# Patient Record
Sex: FEMALE | Race: WHITE
[De-identification: names, ages, dates, MRNs, and addresses within clinical notes are randomized per-mention and may not be internally consistent; named-entity substitution may affect disease eponyms.]

---

## 2017-10-13 ENCOUNTER — HOSPITAL ENCOUNTER (OUTPATIENT)
Dept: HOSPITAL 61 - RAD | Age: 37
Discharge: HOME | End: 2017-10-13
Payer: OTHER GOVERNMENT

## 2017-10-13 VITALS — WEIGHT: 130 LBS | HEIGHT: 70 IN | BODY MASS INDEX: 18.61 KG/M2

## 2017-10-13 DIAGNOSIS — J45.909: ICD-10-CM

## 2017-10-13 DIAGNOSIS — N97.9: Primary | ICD-10-CM

## 2017-10-13 LAB
NEG OBC UR: (no result)
POS OBC UR: (no result)

## 2017-10-13 PROCEDURE — 58340 CATHETER FOR HYSTEROGRAPHY: CPT

## 2017-10-13 PROCEDURE — 81025 URINE PREGNANCY TEST: CPT

## 2017-10-13 PROCEDURE — 74740 X-RAY FEMALE GENITAL TRACT: CPT

## 2018-04-01 ENCOUNTER — HOSPITAL ENCOUNTER (EMERGENCY)
Dept: HOSPITAL 63 - ER | Age: 38
Discharge: HOME | End: 2018-04-01
Payer: OTHER GOVERNMENT

## 2018-04-01 VITALS — SYSTOLIC BLOOD PRESSURE: 107 MMHG | DIASTOLIC BLOOD PRESSURE: 63 MMHG

## 2018-04-01 VITALS — BODY MASS INDEX: 18.8 KG/M2 | HEIGHT: 71 IN | WEIGHT: 134.26 LBS

## 2018-04-01 DIAGNOSIS — E86.0: Primary | ICD-10-CM

## 2018-04-01 DIAGNOSIS — D64.9: ICD-10-CM

## 2018-04-01 LAB
APTT PPP: YELLOW S
BACTERIA #/AREA URNS HPF: 0 /HPF
BILIRUB UR QL STRIP: (no result)
FIBRINOGEN PPP-MCNC: CLEAR MG/DL
GLUCOSE UR STRIP-MCNC: (no result) MG/DL
NITRITE UR QL STRIP: (no result)
RBC #/AREA URNS HPF: (no result) /HPF (ref 0–2)
SP GR UR STRIP: 1.02
SQUAMOUS #/AREA URNS LPF: (no result) /LPF
U PREG PATIENT: NEGATIVE
UROBILINOGEN UR-MCNC: 0.2 MG/DL
WBC #/AREA URNS HPF: (no result) /HPF (ref 0–4)

## 2018-04-01 PROCEDURE — 81025 URINE PREGNANCY TEST: CPT

## 2018-04-01 PROCEDURE — 96361 HYDRATE IV INFUSION ADD-ON: CPT

## 2018-04-01 PROCEDURE — 96372 THER/PROPH/DIAG INJ SC/IM: CPT

## 2018-04-01 PROCEDURE — 96374 THER/PROPH/DIAG INJ IV PUSH: CPT

## 2018-04-01 PROCEDURE — 99284 EMERGENCY DEPT VISIT MOD MDM: CPT

## 2018-04-01 PROCEDURE — 81001 URINALYSIS AUTO W/SCOPE: CPT

## 2018-04-01 NOTE — PHYS DOC
Adult General


Chief Complaint


Chief Complaint:  NAUSEA/VOMITING/DIARRHEA





HPI


HPI





Patient is a 37-year-old female who presents with complaints of vomiting and 

diarrhea both nonbloody that started approximately at 1600 hrs. no  sick 

contacts that are known. Patient has been her usual state of health.





Review of Systems


Review of Systems





Constitutional: Subjective fever





HENT: Denies pain


Respiratory: Denies cough or shortness of breath []


Cardiovascular: No pain


GI: As per history of present illness


: Denies dysuria or hematuria []


Musculoskeletal: Denies back pain or joint pain []


Integument: Denies rash or skin lesions []


Neurologic: Denies headache, focal weakness or sensory changes []








All other systems were reviewed and found to be within normal limits, except as 

documented in this note.





Current Medications


Current Medications





Current Medications








 Medications


  (Trade)  Dose


 Ordered  Sig/Kellie  Start Time


 Stop Time Status Last Admin


Dose Admin


 


 Ondansetron HCl


  (Zofran Odt)  4 mg  1X  ONCE  4/1/18 21:30


 4/1/18 21:31 DC 4/1/18 21:12


4 MG


 


 Ondansetron HCl


  (Zofran)  4 mg  1X  ONCE  4/1/18 21:30


 4/1/18 21:31 DC 4/1/18 21:30


4 MG


 


 Promethazine HCl


  (Phenergan Im)  25 mg  1X  ONCE  4/1/18 23:30


 4/1/18 23:31   


 


 


 Sodium Chloride  1,000 ml @ 


 1,000 mls/hr  1X  ONCE  4/1/18 23:30


 4/2/18 00:29  4/1/18 22:16


1,000 MLS/HR











Allergies


Allergies





Allergies








Coded Allergies Type Severity Reaction Last Updated Verified


 


  No Known Drug Allergies    4/1/18 No











Physical Exam


Physical Exam





Constitutional: Well developed, well nourished, no acute distress, non-toxic 

appearance. []


HENT: Normocephalic, atraumatic, bilateral external ears normal, oropharynx dry

, no oral exudates, nose normal. []


Eyes:  EOMI, conjunctiva normal, no discharge. [] 


Neck: Normal range of motion, trachea midline, no stridor. [] 


Cardiovascular: Tachycardia, equal pulses, normal perfusion


Lungs & Thorax:  Bilateral breath sounds clear to auscultation, no tachypnea. 

Oxygen saturation 97% on room air


Abdomen: Bowel sounds normal, soft, no tenderness, no masses, no pulsatile 

masses. [] 


Skin: Warm, dry, no erythema, no rash. [] 


Back: No tenderness, no CVA tenderness. Normal range of motion


Extremities: No tenderness, no DVT, ROM intact, no edema. [] 


Neurologic: Alert and oriented X 3, normal motor function, and relates in the 

ED with normal gait and without assistance, no focal deficits noted. []


Psychologic: Affect normal, judgement normal, mood normal. []





Current Patient Data


Lab Results





 Laboratory Tests








Test


  4/1/18


21:57


 


POC Urine HCG, Qualitative


  hcg negative


(Negative)











EKG


EKG


[]





Radiology/Procedures


Radiology/Procedures


[]





Course & Med Decision Making


Course & Med Decision Making


Pertinent Labs and Imaging studies reviewed. (See chart for details)





2314 patient has no vomited in the emergency department





2341 pt 





I discussed the lab results with the patient and her  and advised her to 

get her hemoglobin level rechecked and discuss the anemia with her PCP in 

addition I recommended she check her heart rate at least a couple times every 

day for the next couple days and discuss possible tachycardia with her PCP as 

well as her specialist. It is possible that the patient's tachycardia is a 

result of a the medications she is currently taking her increase her ovulation. 

It is of note that the patient has no complaint of any chest pain or shortness 

of breath initially or after the remaining in the ED. The patient certainly is 

not displaying any signs of pulmonary embolism or DVT at the time of this ED 

evaluation. Patient has been understand the need for reevaluation and 

reexamination and agreed to follow-up as directed.  Patient states she is 

already taking iron in the pills that she takes at home


[]





Dragon Disclaimer


Dragon Disclaimer


This electronic medical record was generated, in whole or in part, using a 

voice recognition dictation system.





Departure


Departure:


Impression:  


 Primary Impression:  


 Dehydration


 Additional Impressions:  


 Vomiting and diarrhea


 Anemia


Disposition:  01 HOME, SELF-CARE


Condition:  STABLE


Referrals:  


PCP,UNKNOWN (PCP)


Please follow-up with your PCP in 2 days for recheck and reevaluation. If your 

symptoms worsen or new concerning symptoms develop please return to the ED 

immediately


Patient Instructions:  Anemia, Nonspecific-Brief, Dehydration, Adult, Diarrhea, 

Nausea and Vomiting


Scripts


Ondansetron (ONDANSETRON ODT) 4 Mg Tab.rapdis


4 MG PO TID for 3 Days, #9 TAB


   Prov: ZORAIDA RANGEL MD         4/1/18 


Loperamide HCl (Imodium A-D) 2 Mg Capsule


2 MG PO BID for 3 Days, #6 CAP


   Prov: ZORAIDA RANGEL MD         4/1/18





Problem Qualifiers











ZORAIDA RANGEL MD Apr 1, 2018 23:18